# Patient Record
Sex: FEMALE | Race: OTHER | NOT HISPANIC OR LATINO | URBAN - METROPOLITAN AREA
[De-identification: names, ages, dates, MRNs, and addresses within clinical notes are randomized per-mention and may not be internally consistent; named-entity substitution may affect disease eponyms.]

---

## 2023-05-02 ENCOUNTER — APPOINTMENT (RX ONLY)
Dept: URBAN - METROPOLITAN AREA CLINIC 41 | Facility: CLINIC | Age: 68
Setting detail: DERMATOLOGY
End: 2023-05-02

## 2023-05-02 VITALS — HEIGHT: 64 IN | WEIGHT: 145 LBS

## 2023-05-02 DIAGNOSIS — Z41.9 ENCOUNTER FOR PROCEDURE FOR PURPOSES OTHER THAN REMEDYING HEALTH STATE, UNSPECIFIED: ICD-10-CM

## 2023-05-02 PROCEDURE — ? COSMETIC CONSULTATION: BOTOX

## 2023-05-02 PROCEDURE — ? COSMETIC CONSULTATION: FILLERS

## 2023-05-02 PROCEDURE — ? COSMETIC CONSULTATION: BLEPHAROPLASTY

## 2023-05-02 PROCEDURE — ? PATIENT SPECIFIC COUNSELING

## 2023-05-02 PROCEDURE — ? COSMETIC CONSULTATION: LASER RESURFACING

## 2023-09-18 ENCOUNTER — APPOINTMENT (RX ONLY)
Dept: URBAN - METROPOLITAN AREA CLINIC 41 | Facility: CLINIC | Age: 68
Setting detail: DERMATOLOGY
End: 2023-09-18

## 2023-09-18 VITALS
HEART RATE: 87 BPM | HEIGHT: 62 IN | WEIGHT: 150 LBS | DIASTOLIC BLOOD PRESSURE: 71 MMHG | SYSTOLIC BLOOD PRESSURE: 96 MMHG

## 2023-09-18 VITALS — HEIGHT: 62 IN | WEIGHT: 150 LBS

## 2023-09-18 DIAGNOSIS — Z41.9 ENCOUNTER FOR PROCEDURE FOR PURPOSES OTHER THAN REMEDYING HEALTH STATE, UNSPECIFIED: ICD-10-CM

## 2023-09-18 PROCEDURE — ? PRESCRIPTION

## 2023-09-18 PROCEDURE — ? PATIENT SPECIFIC COUNSELING

## 2023-09-18 RX ORDER — CEPHALEXIN 500 MG/1
TABLET ORAL BID
Qty: 14 | Refills: 0 | Status: ERX | COMMUNITY
Start: 2023-09-18

## 2023-09-18 RX ORDER — VALACYCLOVIR 500 MG/1
TABLET, FILM COATED ORAL BID
Qty: 14 | Refills: 0 | Status: ERX | COMMUNITY
Start: 2023-09-18

## 2023-09-18 RX ORDER — ERYTHROMYCIN 5 MG/G
OINTMENT OPHTHALMIC
Qty: 7 | Refills: 2 | Status: ERX | COMMUNITY
Start: 2023-09-18

## 2023-09-18 RX ADMIN — ERYTHROMYCIN: 5 OINTMENT OPHTHALMIC at 00:00

## 2023-09-18 RX ADMIN — VALACYCLOVIR: 500 TABLET, FILM COATED ORAL at 00:00

## 2023-09-18 RX ADMIN — CEPHALEXIN: 500 TABLET ORAL at 00:00

## 2023-09-18 NOTE — HPI: OTHER
Condition:: Pre op
Please Describe Your Condition:: is being seen for a Pre op for upper blepharoplasty with full face CO2 laser resurfacing.

## 2023-09-18 NOTE — PROCEDURE: PATIENT SPECIFIC COUNSELING
Detail Level: Simple
Pre-operative visit for upper blepharoplasty with  full face CO2 laser resurfacing on 10/4/2023. Will plan to do a complementary neck and dorsal hands CO2 laser as well. \\n\\nMedications, past medical history, past surgical history, and allergies (seasonale 91) reviewed and updated in [facility].\\n\\nMedications:\\nAdult Low Dose Aspirin 81 mg Oral - tablet,delayed release (DR/EC)\\nFosamax 70 mg Oral - Dose: 1 tablet Frequency: weekly\\nlosartan 25 mg Oral - Dose: 1 tablet Frequency: QD\\ntrazodone 50 mg Oral - tablet Frequency: PRN\\nFish collagen powder\\n\\nPlease see scanned pre-operative form.\\n\\nRevisited healing duration as well as risks and benefits of each procedure.\\n\\nShe can wear make up at day 10 to cover redness. \\n\\nShe will hold aspirin one week prior to surgery. Will have her hold 1 month post operatively as well. She takes it as a prevention. \\n\\nDenies any history of stroke, cardiac, renal, or hepatic issues. \\n\\nPatient does have a history of hypertension, leaky valve unclear if mitral or aortic that is being closely monitored echo last month was stable.  Exercised induced asthma and seasonal allergies.    \\n\\nPast surgical history arthroscopy of left knee 7/20/2023, right knee replacement 2020 and hysterectomy. \\n\\nVitals:\\nblood pressure of 96/71 \\npulse 87\\nheight 62\\nweight 150\\n\\nShe will check in at 11:50 am.

## 2023-09-22 ENCOUNTER — RX ONLY (OUTPATIENT)
Age: 68
Setting detail: RX ONLY
End: 2023-09-22

## 2023-09-22 RX ORDER — ERYTHROMYCIN 5 MG/G
OINTMENT OPHTHALMIC
Qty: 7 | Refills: 2 | Status: ERX

## 2023-10-04 ENCOUNTER — APPOINTMENT (RX ONLY)
Dept: URBAN - METROPOLITAN AREA CLINIC 17 | Facility: CLINIC | Age: 68
Setting detail: DERMATOLOGY
End: 2023-10-04

## 2023-10-04 DIAGNOSIS — Z41.9 ENCOUNTER FOR PROCEDURE FOR PURPOSES OTHER THAN REMEDYING HEALTH STATE, UNSPECIFIED: ICD-10-CM

## 2023-10-04 PROCEDURE — ? BLEPHAROPLASTY

## 2023-10-04 PROCEDURE — ? LASER RESURFACING

## 2023-10-04 ASSESSMENT — LOCATION DETAILED DESCRIPTION DERM
LOCATION DETAILED: RIGHT FOREHEAD
LOCATION DETAILED: RIGHT CENTRAL MALAR CHEEK
LOCATION DETAILED: LEFT FOREHEAD
LOCATION DETAILED: RIGHT SUPERIOR MEDIAL BUCCAL CHEEK
LOCATION DETAILED: LEFT CENTRAL MALAR CHEEK
LOCATION DETAILED: LEFT LOWER CUTANEOUS LIP
LOCATION DETAILED: LEFT SUPERIOR CENTRAL BUCCAL CHEEK
LOCATION DETAILED: RIGHT LATERAL SUPERIOR EYELID
LOCATION DETAILED: RIGHT UPPER CUTANEOUS LIP
LOCATION DETAILED: LEFT UPPER CUTANEOUS LIP
LOCATION DETAILED: RIGHT LOWER CUTANEOUS LIP
LOCATION DETAILED: LEFT LATERAL SUPERIOR EYELID

## 2023-10-04 ASSESSMENT — LOCATION SIMPLE DESCRIPTION DERM
LOCATION SIMPLE: RIGHT FOREHEAD
LOCATION SIMPLE: LEFT CHEEK
LOCATION SIMPLE: LEFT SUPERIOR EYELID
LOCATION SIMPLE: RIGHT LIP
LOCATION SIMPLE: RIGHT SUPERIOR EYELID
LOCATION SIMPLE: LEFT LIP
LOCATION SIMPLE: LEFT FOREHEAD
LOCATION SIMPLE: RIGHT CHEEK

## 2023-10-04 ASSESSMENT — LOCATION ZONE DERM
LOCATION ZONE: LIP
LOCATION ZONE: EYELID
LOCATION ZONE: FACE

## 2023-10-04 NOTE — PROCEDURE: LASER RESURFACING
Post-Care Instructions: I reviewed with the patient in detail post-care instructions. Patient should avoid sun until area fully healed. Pt should apply vaseline to treated areas, and remove crusts gently with water-vinegar soaks.
Length Of Topical Anesthesia Application (Optional): 60 minutes
Detail Level: Detailed
Laser Type: Lumenis UltraPulse CO2 laser
Anesthesia Type: 1% lidocaine with epinephrine
External Cooling Fan Speed: 5
Anesthesia Volume In Cc: 9
Energy(Mj): 125
Number Of Passes: 1
Corneal Shield Text: A corneal shield was inserted after appropriate application of topical anesthesia.
Consent: Written consent obtained, risks reviewed including but not limited to crusting, scabbing, blistering, scarring, darker or lighter pigmentary change, incomplete improvement of dyschromia, wrinkles, prolonged erythema and facial swelling, infection and bleeding.
Was A Corneal Shield Used?: Yes
Wavelength: 10,600nm
Topical Anesthesia?: 23% lidocaine, 7% tetracaine

## 2023-10-04 NOTE — PROCEDURE: BLEPHAROPLASTY
Conjunctiva Closure: running
Canthotomy: A lateral canthotomy and cantholyis of the inferior surendra was performed. A lateral canthal tendon was created in the usual standard manner and secured to the lateral orbital rim periosteum with 2 interrupted 5-0 Prolene sutures. The lateral commissure was reestablished joining the upper and lower eyelids at the gray line with interrupted 7-0 vicryl suture with the knot buried in the soft tissues.
Intro: The patient was prepped with 10% povidone iodine solution on the skin, and a few drops of 5% povidone iodine solution were placed in the interior fornix. A drape was placed over the eyes in the usual sterile fashion.
Skin Closure Sutures: 6-0 fast asborbing gut
Post-Care Instructions: An ice compress was applied over the eyes. At the conclusion of the procedure, the patient left the operating room in good condition.   The patient was advised to call immediately for any pain, lid swelling or tenderness, discharge, or loss of vision. The patient will return in several days for a postoperative exam.
Wound Care Applied To Incision Site: Erythromycin ointment
Lower Eyelid Blepharoplasty Ii: An overlappping technique was then utilized to dermine the amount of redundant skin and muscle to be excised from lower eyelids. This determination revealed a 3mm amount of vertical skin to be excised which was performed across the length of the skin flap.
Estimated Blood Loss (Cc): minimal
Temporary Frost: The previously placed traction sutures was now secured in place to the forehead with Steri-Strips and Mastisol solution as a temporary Frost suture.
Traction Suture: A 4-0 silk traction suture was plaved through the upper lid margins and traction was applied inferiorly.
Detail Level: Generalized
Skin Closure Sutures: 6-0 Prolene
Consent: The risks, benefits and alternatives of blepharoplasty were discussed with the patient. The patient read and signed the consent form, was identified, and was seated in the exam chair. In the preoperative area with the patient positioned upright on the stretcher, a marker pen was used to delineate the natural lid creases in the affected lids, and the amount of redundant skin and muscle to be removed utilizing a pinch technique. An intravenous line was established and cardiac monitors were placed.
Lower Eyelid Blepharoplasty I: A 4-0 silk traction suture was placed through the lower lid margins and traction was applied superiorly. A 15 blade was used to make an infraciliary incision across the length of the eyelid to the latereal commissure. A skin muscle flap was dissected using iris scissors down to the level of the interior orbital rim. The nasal, central, and temporal fat compartments were exposed and these were also trimmed back to the level of the inferior orbital rim with a conservative fat resection removal to debulk the herniation of the fat compartment. Minimal resection of the nasal fat pad performed with the predominance inolving the temporal fat pad. Meticulous hemostasis was maintained.
Upper Eyelid Blepharoplasty: A 15 blade was used to make a skin incision along the elliptical demarcation. Scissors were used to excise the skin. All active bleeding points were meticulously controlled with a biplolar and/or Bovie cautery.

## 2023-10-05 ENCOUNTER — APPOINTMENT (RX ONLY)
Dept: URBAN - METROPOLITAN AREA CLINIC 17 | Facility: CLINIC | Age: 68
Setting detail: DERMATOLOGY
End: 2023-10-05

## 2023-10-05 DIAGNOSIS — Z41.9 ENCOUNTER FOR PROCEDURE FOR PURPOSES OTHER THAN REMEDYING HEALTH STATE, UNSPECIFIED: ICD-10-CM

## 2023-10-05 PROCEDURE — ? CONSENT FOR TELEMEDICINE VISIT OBTAINED

## 2023-10-05 NOTE — HPI: COSMETIC FOLLOW UP
How Did You Tolerate The Procedure?: well, without problems
What Condition Are We Treating?: 1 day post bilateral upper blepharoplasty with CO2 laser resurfacing to the full face, neck and bilateral hands.
What Procedure Did We Perform At The Last Visit?: bilateral upper blepharoplasty with CO2 laser resurfacing to the full face, neck and bilateral hands.

## 2023-10-05 NOTE — PROCEDURE: CONSENT FOR TELEMEDICINE VISIT OBTAINED
Consent Text: 1 day post bilateral upper blepharoplasty with CO2 laser resurfacing to the full face, neck and bilateral hands.  Patient notes discomfort when attempting to close her eyes. Due to swelling and pulling on the sutures. She notes fatigue and reduced appetite. Patient states not having any discomfort with the CO2 laser and is continuing vinegar soak treatments and following with Vaseline. Patient will follow up on 10/10/23 to get sutures removed.

## 2023-10-10 ENCOUNTER — APPOINTMENT (RX ONLY)
Dept: URBAN - METROPOLITAN AREA CLINIC 41 | Facility: CLINIC | Age: 68
Setting detail: DERMATOLOGY
End: 2023-10-10

## 2023-10-10 DIAGNOSIS — Z41.9 ENCOUNTER FOR PROCEDURE FOR PURPOSES OTHER THAN REMEDYING HEALTH STATE, UNSPECIFIED: ICD-10-CM

## 2023-10-10 PROCEDURE — ? SUTURE REMOVAL

## 2023-10-10 PROCEDURE — ? ADDITIONAL NOTES

## 2023-10-10 PROCEDURE — ? COSMETIC FOLLOW-UP

## 2023-10-10 PROCEDURE — ? PRESCRIPTION

## 2023-10-10 RX ORDER — TRIAMCINOLONE ACETONIDE 1 MG/G
OINTMENT TOPICAL
Qty: 30 | Refills: 6 | Status: ERX | COMMUNITY
Start: 2023-10-10

## 2023-10-10 RX ADMIN — TRIAMCINOLONE ACETONIDE: 1 OINTMENT TOPICAL at 00:00

## 2023-10-10 ASSESSMENT — LOCATION DETAILED DESCRIPTION DERM
LOCATION DETAILED: RIGHT LATERAL SUPERIOR EYELID
LOCATION DETAILED: LEFT LATERAL SUPERIOR EYELID

## 2023-10-10 ASSESSMENT — LOCATION SIMPLE DESCRIPTION DERM
LOCATION SIMPLE: RIGHT SUPERIOR EYELID
LOCATION SIMPLE: LEFT SUPERIOR EYELID

## 2023-10-10 ASSESSMENT — LOCATION ZONE DERM: LOCATION ZONE: EYELID

## 2023-10-10 NOTE — PROCEDURE: COSMETIC FOLLOW-UP
Comments (Free Text): Apply vaseline to nose and around mouth for one more week.\\nSuture removed today. \\nPatient soaked for 20  minutes and Vaseline applied.
Detail Level: Zone

## 2023-10-18 ENCOUNTER — APPOINTMENT (RX ONLY)
Dept: URBAN - METROPOLITAN AREA CLINIC 17 | Facility: CLINIC | Age: 68
Setting detail: DERMATOLOGY
End: 2023-10-18

## 2023-10-18 DIAGNOSIS — Z41.9 ENCOUNTER FOR PROCEDURE FOR PURPOSES OTHER THAN REMEDYING HEALTH STATE, UNSPECIFIED: ICD-10-CM

## 2023-10-18 PROCEDURE — ? COSMETIC FOLLOW-UP

## 2023-10-18 ASSESSMENT — LOCATION DETAILED DESCRIPTION DERM
LOCATION DETAILED: LEFT CHIN
LOCATION DETAILED: RIGHT LATERAL SUPERIOR EYELID
LOCATION DETAILED: SUPERIOR MID FOREHEAD
LOCATION DETAILED: LEFT LATERAL SUPERIOR EYELID
LOCATION DETAILED: RIGHT INFERIOR CENTRAL MALAR CHEEK
LOCATION DETAILED: LEFT INFERIOR CENTRAL MALAR CHEEK

## 2023-10-18 ASSESSMENT — LOCATION SIMPLE DESCRIPTION DERM
LOCATION SIMPLE: RIGHT SUPERIOR EYELID
LOCATION SIMPLE: SUPERIOR FOREHEAD
LOCATION SIMPLE: LEFT CHEEK
LOCATION SIMPLE: RIGHT CHEEK
LOCATION SIMPLE: CHIN
LOCATION SIMPLE: LEFT SUPERIOR EYELID

## 2023-10-18 ASSESSMENT — LOCATION ZONE DERM
LOCATION ZONE: FACE
LOCATION ZONE: EYELID

## 2023-10-18 NOTE — PROCEDURE: COSMETIC FOLLOW-UP
Detail Level: Zone
Comments (Free Text): Patient has green light to return to normal activity.\\n\\nDiscussed recall redness.\\n\\nThe next couple of days sunscreen will get easier to remove. \\n\\nArnica is okay to use on eyelid. \\n\\nLines above upper eyelid 2-3 months for swelling to upper eyelids to dissipate.\\n\\nOkay to discontinue Vaseline, vinegar soaks and  steroids. \\n\\nCerave gentle cleanser.\\n\\nElta MD sunscreen is okay to use. \\n\\nPre and post photos photos reviewed.